# Patient Record
Sex: MALE | Race: WHITE | NOT HISPANIC OR LATINO
[De-identification: names, ages, dates, MRNs, and addresses within clinical notes are randomized per-mention and may not be internally consistent; named-entity substitution may affect disease eponyms.]

---

## 2024-08-21 ENCOUNTER — APPOINTMENT (OUTPATIENT)
Dept: ORTHOPEDIC SURGERY | Facility: CLINIC | Age: 54
End: 2024-08-21
Payer: COMMERCIAL

## 2024-08-21 VITALS — WEIGHT: 210 LBS | HEIGHT: 69 IN | BODY MASS INDEX: 31.1 KG/M2

## 2024-08-21 DIAGNOSIS — Z00.00 ENCOUNTER FOR GENERAL ADULT MEDICAL EXAMINATION W/OUT ABNORMAL FINDINGS: ICD-10-CM

## 2024-08-21 DIAGNOSIS — Z82.49 FAMILY HISTORY OF ISCHEMIC HEART DISEASE AND OTHER DISEASES OF THE CIRCULATORY SYSTEM: ICD-10-CM

## 2024-08-21 DIAGNOSIS — M25.374 OTHER INSTABILITY, RIGHT FOOT: ICD-10-CM

## 2024-08-21 DIAGNOSIS — Z87.2 PERSONAL HISTORY OF DISEASES OF THE SKIN AND SUBCUTANEOUS TISSUE: ICD-10-CM

## 2024-08-21 DIAGNOSIS — M20.41 OTHER HAMMER TOE(S) (ACQUIRED), RIGHT FOOT: ICD-10-CM

## 2024-08-21 PROCEDURE — 99204 OFFICE O/P NEW MOD 45 MIN: CPT

## 2024-08-21 PROCEDURE — 73630 X-RAY EXAM OF FOOT: CPT | Mod: RT

## 2024-08-21 RX ORDER — ADALIMUMAB 40MG/0.8ML
40 KIT SUBCUTANEOUS
Refills: 0 | Status: ACTIVE | COMMUNITY

## 2024-08-21 NOTE — HISTORY OF PRESENT ILLNESS
[Gradual] : gradual [5] : 5 [4] : 4 [Radiating] : radiating [Standing] : standing [Walking] : walking [Full time] : Work status: full time [de-identified] : 8/21/24: Patient here for further evaluation of his right foot. He states his 2nd toe is "sticking" up and causing him pain for the past 3 months. No injury or trauma. He also c/o numbness.  [] : no [FreeTextEntry1] : rt foot [FreeTextEntry5] : rt foot pain for 2 months . had MCL tear last summer and had pain in the foot went to PT. Toe is up and over  [FreeTextEntry6] : numbness in second toe  [FreeTextEntry7] : toe [FreeTextEntry9] : wearing better sneakers [de-identified] : walk barefoot on uneven gound  [de-identified] : [facility] 2 weeks ago  [de-identified] : does not have with him xr from [facility]

## 2024-08-21 NOTE — IMAGING
[Right] : right foot [Weight -] : weightbearing [de-identified] : longer 2nd metatarsal. 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

## 2024-08-21 NOTE — DISCUSSION/SUMMARY
[Surgical risks reviewed] : Surgical risks reviewed [de-identified] : budin splint. Discussed surgery for hammer toe/mtp instabilitycorrection. The risks, benefits, alternatives have been discussed.  The risks include but are not limited to infection, bleeding, injury to small nerves and blood vessels, pain, stiffness, progression, dvt, PE, amputation and death.